# Patient Record
Sex: FEMALE | Race: WHITE | NOT HISPANIC OR LATINO
[De-identification: names, ages, dates, MRNs, and addresses within clinical notes are randomized per-mention and may not be internally consistent; named-entity substitution may affect disease eponyms.]

---

## 2020-09-18 ENCOUNTER — RESULT REVIEW (OUTPATIENT)
Age: 60
End: 2020-09-18

## 2022-10-20 ENCOUNTER — APPOINTMENT (OUTPATIENT)
Dept: OTOLARYNGOLOGY | Facility: CLINIC | Age: 62
End: 2022-10-20

## 2022-10-20 VITALS
DIASTOLIC BLOOD PRESSURE: 73 MMHG | SYSTOLIC BLOOD PRESSURE: 128 MMHG | HEART RATE: 60 BPM | BODY MASS INDEX: 21.35 KG/M2 | HEIGHT: 62 IN | WEIGHT: 116 LBS

## 2022-10-20 DIAGNOSIS — K57.90 DIVERTICULOSIS OF INTESTINE, PART UNSPECIFIED, W/OUT PERFORATION OR ABSCESS W/OUT BLEEDING: ICD-10-CM

## 2022-10-20 DIAGNOSIS — Z87.891 PERSONAL HISTORY OF NICOTINE DEPENDENCE: ICD-10-CM

## 2022-10-20 DIAGNOSIS — H61.23 IMPACTED CERUMEN, BILATERAL: ICD-10-CM

## 2022-10-20 PROCEDURE — 99243 OFF/OP CNSLTJ NEW/EST LOW 30: CPT | Mod: 25

## 2022-10-20 PROCEDURE — 69210 REMOVE IMPACTED EAR WAX UNI: CPT

## 2022-10-20 RX ORDER — AMOXICILLIN AND CLAVULANATE POTASSIUM 875; 125 MG/1; MG/1
875-125 TABLET, COATED ORAL
Qty: 20 | Refills: 0 | Status: ACTIVE | COMMUNITY
Start: 2022-10-20 | End: 1900-01-01

## 2022-10-20 RX ORDER — METOPROLOL SUCCINATE 50 MG/1
50 TABLET, EXTENDED RELEASE ORAL
Refills: 0 | Status: ACTIVE | COMMUNITY

## 2022-10-20 RX ORDER — AMOXICILLIN AND CLAVULANATE POTASSIUM 875; 125 MG/1; MG/1
875-125 TABLET, COATED ORAL
Refills: 0 | Status: ACTIVE | COMMUNITY

## 2022-10-20 RX ORDER — FLUTICASONE PROPIONATE 50 UG/1
50 SPRAY, METERED NASAL TWICE DAILY
Qty: 1 | Refills: 3 | Status: ACTIVE | COMMUNITY
Start: 2022-10-20 | End: 1900-01-01

## 2022-10-20 RX ORDER — METHYLPREDNISOLONE 16 MG/1
TABLET ORAL
Refills: 0 | Status: ACTIVE | COMMUNITY

## 2022-10-20 NOTE — CONSULT LETTER
[Dear  ___] : Dear  [unfilled], [Consult Letter:] : I had the pleasure of evaluating your patient, [unfilled]. [Please see my note below.] : Please see my note below. [Consult Closing:] : Thank you very much for allowing me to participate in the care of this patient.  If you have any questions, please do not hesitate to contact me. [Sincerely,] : Sincerely, [FreeTextEntry3] : David Miles M.D.

## 2022-10-20 NOTE — HISTORY OF PRESENT ILLNESS
[de-identified] : Génesis Ram is a 63 yo female with hx sinus surgery and rhinoplasty who was referred by Dr. Grant for evaluation of sinusitis. She states that a week ago, she went to urgent care for sore throat. Covid and strep were negative. She was prescribed amoxicillin. She then developed sinus pressure, postnasal drainage, and cough. She notes that her sore throat have improved. She had discolored rhinorrhea but this has improved since starting antibiotics. She is currently on day 4 of amoxicillin. She had fevers last week. She denies vision changes or pain/restriction of extraocular movmeents. She notes bilateral aural fullness and underwent ear flushing for cerumen impaction. She note history of sinus infections, about once every few years. She denies allergies but not has been tested as an adult. She does not use nasal sprays.

## 2022-10-20 NOTE — PHYSICAL EXAM
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 Bilateral EAC clear.

## 2022-10-20 NOTE — ASSESSMENT
[FreeTextEntry1] : Génesis Ram presmadinas for evaluation of acute sinusitis. she has history of rhinoplasty and previous sinus surgery for nasal polyps. She is on amoxicillin and medrol dose pack. She notes aural fullness with muffled hearing. 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 No nasopharyngeal mass. Bilateral cerumen was removed. Will change antibiotic and start topical nasal regimen.\par \par - Augmentin x 10 days. Side effects were discussed and include but are not limited to nausea, vomiting, diarrhea, and skin rash.\par - Fluticasone 2 sprays BID\par - Afrin 1 spray BID x 48 hours only\par - Nasal saline sprays TID\par - Follow up in 2 weeks
The patient has been re-examined and I agree with the above assessment or I updated with my findings.

## 2022-11-02 ENCOUNTER — APPOINTMENT (OUTPATIENT)
Dept: OTOLARYNGOLOGY | Facility: CLINIC | Age: 62
End: 2022-11-02

## 2022-11-02 ENCOUNTER — NON-APPOINTMENT (OUTPATIENT)
Age: 62
End: 2022-11-02

## 2022-11-02 VITALS
BODY MASS INDEX: 21.35 KG/M2 | DIASTOLIC BLOOD PRESSURE: 76 MMHG | HEIGHT: 62 IN | SYSTOLIC BLOOD PRESSURE: 124 MMHG | HEART RATE: 57 BPM | WEIGHT: 116 LBS

## 2022-11-02 DIAGNOSIS — J34.2 DEVIATED NASAL SEPTUM: ICD-10-CM

## 2022-11-02 DIAGNOSIS — J01.90 ACUTE SINUSITIS, UNSPECIFIED: ICD-10-CM

## 2022-11-02 PROCEDURE — 99212 OFFICE O/P EST SF 10 MIN: CPT

## 2022-11-02 NOTE — HISTORY OF PRESENT ILLNESS
[de-identified] : Génesis Ram is a 61 yo female with hx sinus surgery and rhinoplasty who was referred by Dr. Grant for evaluation of sinusitis. She states that a week ago, she went to urgent care for sore throat. Covid and strep were negative. She was prescribed amoxicillin. She then developed sinus pressure, postnasal drainage, and cough. She notes that her sore throat have improved. She had discolored rhinorrhea but this has improved since starting antibiotics. She is currently on day 4 of amoxicillin. She had fevers last week. She denies vision changes or pain/restriction of extraocular movmeents. She notes bilateral aural fullness and underwent ear flushing for cerumen impaction. She note history of sinus infections, about once every few years. She denies allergies but not has been tested as an adult. She does not use nasal sprays. [FreeTextEntry1] : 11/2/22 - Ms. Ram presents for follow-up. She completed course of augmentin and topical nasal regimen for acute sinusitis. She states that her nasal congestion, rhinorrhea, and sinus pressure have improved. She denies fevers or chills. She notes cough for the past month that she attributes to postnasal drainage.

## 2022-11-02 NOTE — ASSESSMENT
[FreeTextEntry1] : Génesis Ram presents for follow-up of acute sinusitis. She has history of rhinoplasty and sinus surgery. She completed augmentin, fluticasone, and nasal saline sprays with resolution of acute sinusitis symptoms. 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 She notes cough secondary to persistent postnasal drainage. Offered her allergy evaluation but she declined.\par \par - Continue Fluticasone 2 sprays BID\par - Follow up prn